# Patient Record
Sex: FEMALE | HISPANIC OR LATINO | URBAN - METROPOLITAN AREA
[De-identification: names, ages, dates, MRNs, and addresses within clinical notes are randomized per-mention and may not be internally consistent; named-entity substitution may affect disease eponyms.]

---

## 2019-05-24 ENCOUNTER — OFFICE VISIT (OUTPATIENT)
Dept: URBAN - METROPOLITAN AREA CLINIC 17 | Facility: CLINIC | Age: 84
End: 2019-05-24
Payer: COMMERCIAL

## 2019-05-24 DIAGNOSIS — H27.121: Primary | ICD-10-CM

## 2019-05-24 DIAGNOSIS — H35.61 RETINAL HEMORRHAGE, RIGHT EYE: ICD-10-CM

## 2019-05-24 DIAGNOSIS — H35.3223 EXUDATIVE AGE-RELATED MACULAR DEGENERATION, LEFT EYE, WITH INACTIVE SCAR: ICD-10-CM

## 2019-05-24 PROCEDURE — 92014 COMPRE OPH EXAM EST PT 1/>: CPT | Performed by: OPTOMETRIST

## 2019-05-24 PROCEDURE — 92004 COMPRE OPH EXAM NEW PT 1/>: CPT | Performed by: OPTOMETRIST

## 2019-05-24 ASSESSMENT — INTRAOCULAR PRESSURE
OD: 13
OS: 12

## 2019-05-24 NOTE — IMPRESSION/PLAN
Impression: Dislocated anterior lens of right eye: H27.121. Plan: Discussed diagnosis with pt. Will refer to Dr. Vinh García for evaluation.

## 2019-05-24 NOTE — IMPRESSION/PLAN
Impression: Retinal hemorrhage, right eye: H35.61. Plan: Discussed diagnosis with pt. Will refer to Dr. Giorgio Berman for evaluation.

## 2019-05-24 NOTE — IMPRESSION/PLAN
Impression: Diagnosis: Exudative age-related macular degeneration, left eye, with inactive scar. Code: W02.5388. Longstanding Plan: Discussed diagnosis in detail with patient. Will continue to observe condition and or symptoms.